# Patient Record
Sex: MALE | Race: WHITE
[De-identification: names, ages, dates, MRNs, and addresses within clinical notes are randomized per-mention and may not be internally consistent; named-entity substitution may affect disease eponyms.]

---

## 2021-11-30 ENCOUNTER — HOSPITAL ENCOUNTER (INPATIENT)
Dept: HOSPITAL 94 - ER | Age: 61
LOS: 9 days | Discharge: HOME | DRG: 853 | End: 2021-12-09
Attending: SURGERY | Admitting: SURGERY
Payer: COMMERCIAL

## 2021-11-30 VITALS — DIASTOLIC BLOOD PRESSURE: 72 MMHG | SYSTOLIC BLOOD PRESSURE: 138 MMHG

## 2021-11-30 VITALS — DIASTOLIC BLOOD PRESSURE: 64 MMHG | SYSTOLIC BLOOD PRESSURE: 102 MMHG

## 2021-11-30 VITALS — SYSTOLIC BLOOD PRESSURE: 106 MMHG | DIASTOLIC BLOOD PRESSURE: 63 MMHG

## 2021-11-30 VITALS — SYSTOLIC BLOOD PRESSURE: 133 MMHG | DIASTOLIC BLOOD PRESSURE: 77 MMHG

## 2021-11-30 VITALS — DIASTOLIC BLOOD PRESSURE: 60 MMHG | SYSTOLIC BLOOD PRESSURE: 98 MMHG

## 2021-11-30 VITALS — WEIGHT: 230.49 LBS | HEIGHT: 72 IN | BODY MASS INDEX: 31.22 KG/M2

## 2021-11-30 VITALS — DIASTOLIC BLOOD PRESSURE: 59 MMHG | SYSTOLIC BLOOD PRESSURE: 100 MMHG

## 2021-11-30 VITALS — SYSTOLIC BLOOD PRESSURE: 143 MMHG | DIASTOLIC BLOOD PRESSURE: 83 MMHG

## 2021-11-30 VITALS — SYSTOLIC BLOOD PRESSURE: 110 MMHG | DIASTOLIC BLOOD PRESSURE: 70 MMHG

## 2021-11-30 VITALS — DIASTOLIC BLOOD PRESSURE: 61 MMHG | SYSTOLIC BLOOD PRESSURE: 102 MMHG

## 2021-11-30 VITALS — DIASTOLIC BLOOD PRESSURE: 58 MMHG | SYSTOLIC BLOOD PRESSURE: 99 MMHG

## 2021-11-30 VITALS — SYSTOLIC BLOOD PRESSURE: 113 MMHG | DIASTOLIC BLOOD PRESSURE: 70 MMHG

## 2021-11-30 VITALS — DIASTOLIC BLOOD PRESSURE: 62 MMHG | SYSTOLIC BLOOD PRESSURE: 98 MMHG

## 2021-11-30 VITALS — DIASTOLIC BLOOD PRESSURE: 64 MMHG | SYSTOLIC BLOOD PRESSURE: 101 MMHG

## 2021-11-30 DIAGNOSIS — J96.00: ICD-10-CM

## 2021-11-30 DIAGNOSIS — E66.9: ICD-10-CM

## 2021-11-30 DIAGNOSIS — K57.20: ICD-10-CM

## 2021-11-30 DIAGNOSIS — K65.9: ICD-10-CM

## 2021-11-30 DIAGNOSIS — Z87.442: ICD-10-CM

## 2021-11-30 DIAGNOSIS — Z20.822: ICD-10-CM

## 2021-11-30 DIAGNOSIS — G70.00: ICD-10-CM

## 2021-11-30 DIAGNOSIS — E87.6: ICD-10-CM

## 2021-11-30 DIAGNOSIS — Z79.899: ICD-10-CM

## 2021-11-30 DIAGNOSIS — A41.9: Primary | ICD-10-CM

## 2021-11-30 DIAGNOSIS — G35: ICD-10-CM

## 2021-11-30 LAB
ALBUMIN SERPL BCP-MCNC: 3 G/DL (ref 3.4–5)
ALBUMIN/GLOB SERPL: 0.7 {RATIO} (ref 1.1–1.5)
ALP SERPL-CCNC: 56 IU/L (ref 46–116)
ALT SERPL W P-5'-P-CCNC: 22 U/L (ref 12–78)
AMORPH URATE CRY #/AREA URNS HPF: (no result) /[HPF]
ANION GAP SERPL CALCULATED.3IONS-SCNC: 10 MMOL/L (ref 8–16)
AST SERPL W P-5'-P-CCNC: 21 U/L (ref 10–37)
BACTERIA URNS QL MICRO: (no result) /HPF
BASE EXCESS BLDA CALC-SCNC: -5.9 MMOL/L (ref -2–2)
BASOPHILS # BLD AUTO: 0 X10'3 (ref 0–0.2)
BASOPHILS NFR BLD AUTO: 0.1 % (ref 0–1)
BILIRUB SERPL-MCNC: 1.3 MG/DL (ref 0.1–1)
BODY TEMPERATURE: 38.6
BUN SERPL-MCNC: 20 MG/DL (ref 7–18)
BUN/CREAT SERPL: 14.6 (ref 5.4–32)
CALCIUM SERPL-MCNC: 8.8 MG/DL (ref 8.5–10.1)
CHLORIDE SERPL-SCNC: 102 MMOL/L (ref 99–107)
CLARITY UR: (no result)
CO2 SERPL-SCNC: 24 MMOL/L (ref 24–32)
COHGB MFR BLDA: 0.2 % (ref 0–3.9)
COLOR UR: YELLOW
CREAT SERPL-MCNC: 1.37 MG/DL (ref 0.6–1.1)
DEPRECATED SQUAMOUS URNS QL MICRO: (no result) /LPF
EOSINOPHIL # BLD AUTO: 0 X10'3 (ref 0–0.9)
EOSINOPHIL NFR BLD AUTO: 0.2 % (ref 0–6)
ERYTHROCYTE [DISTWIDTH] IN BLOOD BY AUTOMATED COUNT: 14.2 % (ref 11.5–14.5)
GFR SERPL CREATININE-BSD FRML MDRD: 53 ML/MIN
GLUCOSE SERPL-MCNC: 160 MG/DL (ref 70–104)
GLUCOSE UR STRIP-MCNC: NEGATIVE MG/DL
HCO3 BLDA-SCNC: 19.6 MMOL/L (ref 22–26)
HCT VFR BLD AUTO: 45.7 % (ref 42–52)
HGB BLD-MCNC: 15.3 G/DL (ref 14–17.9)
HGB BLDA-MCNC: 15.7 G/DL (ref 14–18)
HGB UR QL STRIP: NEGATIVE
HYALINE CASTS URNS QL MICRO: (no result) /LPF
KETONES UR STRIP-MCNC: 15 MG/DL
LEUKOCYTE ESTERASE UR QL STRIP: NEGATIVE
LIPASE SERPL-CCNC: 128 U/L (ref 73–393)
LYMPHOCYTES # BLD AUTO: 0.7 X10'3 (ref 1.1–4.8)
LYMPHOCYTES NFR BLD AUTO: 4.3 % (ref 21–51)
MCH RBC QN AUTO: 32.1 PG (ref 27–31)
MCHC RBC AUTO-ENTMCNC: 33.6 G/DL (ref 33–36.5)
MCV RBC AUTO: 95.6 FL (ref 78–98)
METHGB MFR BLDA: 0.5 % (ref 0–1.5)
MONOCYTES # BLD AUTO: 1.2 X10'3 (ref 0–0.9)
MONOCYTES NFR BLD AUTO: 7.1 % (ref 2–12)
MUCOUS THREADS URNS QL MICRO: (no result) /LPF
NEUTROPHILS # BLD AUTO: 15.1 X10'3 (ref 1.8–7.7)
NEUTROPHILS NFR BLD AUTO: 88.3 % (ref 42–75)
NITRITE UR QL STRIP: NEGATIVE
O2/TOTAL GAS SETTING VFR VENT: 80 MMHG/%
OXYHGB MFR BLDA: 98.3 % (ref 94–97)
PCO2 TEMP ADJ BLDA: 41.7 MMHG (ref 35–48)
PEEP RESPIRATORY: 5 CM H2O
PH UR STRIP: 5.5 [PH] (ref 4.8–8)
PLATELET # BLD AUTO: 203 X10'3 (ref 140–440)
PMV BLD AUTO: 7.6 FL (ref 7.4–10.4)
PO2 TEMP ADJ BLD: 193.4 MMHG (ref 75–100)
POTASSIUM SERPL-SCNC: 4 MMOL/L (ref 3.5–5.1)
PROT SERPL-MCNC: 7.2 G/DL (ref 6.4–8.2)
PROT UR QL STRIP: NEGATIVE MG/DL
RBC # BLD AUTO: 4.78 X10'6 (ref 4.7–6.1)
RBC #/AREA URNS HPF: (no result) /HPF (ref 0–2)
RESP RATE 1H: 16 B/MIN
SAO2 % BLDA: 99 % (ref 94–97)
SODIUM SERPL-SCNC: 136 MMOL/L (ref 135–145)
SP GR UR STRIP: >=1.03 (ref 1–1.03)
SPONT VT COMPRES GAS VOL SET UNCOR VENT: 450 ML
URN COLLECT METHOD CLASS: (no result)
UROBILINOGEN UR STRIP-MCNC: 0.2 E.U/DL (ref 0.2–1)
WBC # BLD AUTO: 17.1 X10'3 (ref 4.5–11)
WBC #/AREA URNS HPF: (no result) /HPF (ref 0–4)

## 2021-11-30 PROCEDURE — 83735 ASSAY OF MAGNESIUM: CPT

## 2021-11-30 PROCEDURE — 97116 GAIT TRAINING THERAPY: CPT

## 2021-11-30 PROCEDURE — 0DTN0ZZ RESECTION OF SIGMOID COLON, OPEN APPROACH: ICD-10-PCS | Performed by: SURGERY

## 2021-11-30 PROCEDURE — 74018 RADEX ABDOMEN 1 VIEW: CPT

## 2021-11-30 PROCEDURE — 94760 N-INVAS EAR/PLS OXIMETRY 1: CPT

## 2021-11-30 PROCEDURE — 74177 CT ABD & PELVIS W/CONTRAST: CPT

## 2021-11-30 PROCEDURE — 87040 BLOOD CULTURE FOR BACTERIA: CPT

## 2021-11-30 PROCEDURE — 93005 ELECTROCARDIOGRAM TRACING: CPT

## 2021-11-30 PROCEDURE — 85025 COMPLETE CBC W/AUTO DIFF WBC: CPT

## 2021-11-30 PROCEDURE — 87081 CULTURE SCREEN ONLY: CPT

## 2021-11-30 PROCEDURE — 84100 ASSAY OF PHOSPHORUS: CPT

## 2021-11-30 PROCEDURE — 97110 THERAPEUTIC EXERCISES: CPT

## 2021-11-30 PROCEDURE — 36415 COLL VENOUS BLD VENIPUNCTURE: CPT

## 2021-11-30 PROCEDURE — 82810 BLOOD GASES O2 SAT ONLY: CPT

## 2021-11-30 PROCEDURE — 83690 ASSAY OF LIPASE: CPT

## 2021-11-30 PROCEDURE — 99292 CRITICAL CARE ADDL 30 MIN: CPT

## 2021-11-30 PROCEDURE — 86900 BLOOD TYPING SEROLOGIC ABO: CPT

## 2021-11-30 PROCEDURE — 0D1M0Z4 BYPASS DESCENDING COLON TO CUTANEOUS, OPEN APPROACH: ICD-10-PCS | Performed by: SURGERY

## 2021-11-30 PROCEDURE — 86901 BLOOD TYPING SEROLOGIC RH(D): CPT

## 2021-11-30 PROCEDURE — 36600 WITHDRAWAL OF ARTERIAL BLOOD: CPT

## 2021-11-30 PROCEDURE — 94003 VENT MGMT INPAT SUBQ DAY: CPT

## 2021-11-30 PROCEDURE — 85610 PROTHROMBIN TIME: CPT

## 2021-11-30 PROCEDURE — 84145 PROCALCITONIN (PCT): CPT

## 2021-11-30 PROCEDURE — 82948 REAGENT STRIP/BLOOD GLUCOSE: CPT

## 2021-11-30 PROCEDURE — 97161 PT EVAL LOW COMPLEX 20 MIN: CPT

## 2021-11-30 PROCEDURE — 94002 VENT MGMT INPAT INIT DAY: CPT

## 2021-11-30 PROCEDURE — 81001 URINALYSIS AUTO W/SCOPE: CPT

## 2021-11-30 PROCEDURE — 84550 ASSAY OF BLOOD/URIC ACID: CPT

## 2021-11-30 PROCEDURE — 92508 TX SP LANG VOICE COMM GROUP: CPT

## 2021-11-30 PROCEDURE — 80048 BASIC METABOLIC PNL TOTAL CA: CPT

## 2021-11-30 PROCEDURE — 83605 ASSAY OF LACTIC ACID: CPT

## 2021-11-30 PROCEDURE — 71045 X-RAY EXAM CHEST 1 VIEW: CPT

## 2021-11-30 PROCEDURE — 99291 CRITICAL CARE FIRST HOUR: CPT

## 2021-11-30 PROCEDURE — 92616 FEES W/LARYNGEAL SENSE TEST: CPT

## 2021-11-30 PROCEDURE — 86885 COOMBS TEST INDIRECT QUAL: CPT

## 2021-11-30 PROCEDURE — 85018 HEMOGLOBIN: CPT

## 2021-11-30 PROCEDURE — 87070 CULTURE OTHR SPECIMN AEROBIC: CPT

## 2021-11-30 PROCEDURE — 82803 BLOOD GASES ANY COMBINATION: CPT

## 2021-11-30 PROCEDURE — 84443 ASSAY THYROID STIM HORMONE: CPT

## 2021-11-30 PROCEDURE — 97530 THERAPEUTIC ACTIVITIES: CPT

## 2021-11-30 PROCEDURE — BW211ZZ COMPUTERIZED TOMOGRAPHY (CT SCAN) OF ABDOMEN AND PELVIS USING LOW OSMOLAR CONTRAST: ICD-10-PCS

## 2021-11-30 PROCEDURE — 80053 COMPREHEN METABOLIC PANEL: CPT

## 2021-11-30 PROCEDURE — 87635 SARS-COV-2 COVID-19 AMP PRB: CPT

## 2021-11-30 RX ADMIN — TAZOBACTAM SODIUM AND PIPERACILLIN SODIUM SCH MLS/HR: 375; 3 INJECTION, SOLUTION INTRAVENOUS at 19:15

## 2021-11-30 RX ADMIN — NOREPINEPHRINE BITARTRATE SCH MLS/HR: 1 INJECTION INTRAVENOUS at 11:40

## 2021-11-30 RX ADMIN — SODIUM CHLORIDE SCH MLS/HR: 9 INJECTION INTRAMUSCULAR; INTRAVENOUS; SUBCUTANEOUS at 22:45

## 2021-11-30 RX ADMIN — MORPHINE SULFATE PRN MG: 4 INJECTION, SOLUTION INTRAMUSCULAR; INTRAVENOUS at 13:10

## 2021-11-30 RX ADMIN — SODIUM CHLORIDE SCH MLS/HR: 9 INJECTION INTRAMUSCULAR; INTRAVENOUS; SUBCUTANEOUS at 12:55

## 2021-11-30 RX ADMIN — Medication PRN MLS/HR: at 17:09

## 2021-11-30 RX ADMIN — Medication PRN MLS/HR: at 22:45

## 2021-11-30 RX ADMIN — Medication PRN MLS/HR: at 17:08

## 2021-11-30 RX ADMIN — SODIUM CHLORIDE SCH MLS/HR: 9 INJECTION INTRAMUSCULAR; INTRAVENOUS; SUBCUTANEOUS at 18:20

## 2021-11-30 NOTE — NUR
Patient in room ICU 2042. I have received report from Recovery RN Cee and had the 
opportunity to ask questions and assume patient care. Report given to Kian NGUYEN.

## 2021-11-30 NOTE — NUR
Attempted to give report to the Recovery Room.  Nurse stated that he is going 
straight to the OR and I don't need to give report.  She said to call the OR 
and have them transport the patient.

## 2021-11-30 NOTE — NUR
Patient in room ICU 2042. I have received report from Daphne NGUYEN and had the opportunity to 
ask questions and assume patient care.

## 2021-11-30 NOTE — NUR
Pt c/o severe generalized abd pain.  Hypoactive BS in all quadrants.  Wife at 
the bedside.  Pt is tachycardic.  MD notified.

## 2021-11-30 NOTE — NUR
Attempted to place an NGT as well as OGT, could not get it to pass. It repeatedly coiled in 
the back of the PT's mouth.

## 2021-11-30 NOTE — NUR
PT RESTING COMFORTABLY, SIMV-FIO2 DROPPED TO 50%, VSS, FENTANYL AND VERSED INCREASED PRN BY 
PRIMARY RN TO ASSIST WITH AGITATION, NO CHANGES IN DRSG-OSTOMY INTACT, PT HAS HX OF 
MYASTHENIA GRAVIS, CARE TURNED OVER TO PRIMARY RN-ANUPAMA WHO WAS PRESENT WHEN PT ARRIVED. ALL 
QUESTIONS ANSWERED.

## 2021-11-30 NOTE — NUR
PT ARRIVED TO ICU BED 2042, ACCOMPANIED BY DR. MARTIN, ANESTHESIA-REPORT GIVEN, PT WAKING UP, 
ABLE TO FOLLOW COMMANDS, GOOD HAND , MOVING EXTREMITIES, F/C IN PLACE-DRAINING YELLOW 
URINE, RIGHT GROIN 4 LUMEN CVL-PLACED IN ER, HAS LEVOPHED AND NS RUNNING UPON ARRIVAL, LEVO 
STOPPED PER DR. MARTIN. VSS, SIMV SETTING-SEE FLOWSHEET, ART INE TO RIGHT WRIST, ABD DRSG 
MIDLINE-INTACT NOTED TO HAVE SHADE OF BLEEDING-CIRCLED, FRESH OSTOMY PLACED TO LLQ-NO OUTPUT 
NOTED, BAG IN PLACE, SCDS ON, ET TUBE-8.0 22CM AT LIP-CXR DONE TO CHECK PLACEMENT. RECEIVING 
RN PRESENT AS WELL.

## 2021-12-01 VITALS — SYSTOLIC BLOOD PRESSURE: 110 MMHG | DIASTOLIC BLOOD PRESSURE: 76 MMHG

## 2021-12-01 VITALS — DIASTOLIC BLOOD PRESSURE: 85 MMHG | SYSTOLIC BLOOD PRESSURE: 121 MMHG

## 2021-12-01 VITALS — DIASTOLIC BLOOD PRESSURE: 56 MMHG | SYSTOLIC BLOOD PRESSURE: 99 MMHG

## 2021-12-01 VITALS — SYSTOLIC BLOOD PRESSURE: 110 MMHG | DIASTOLIC BLOOD PRESSURE: 75 MMHG

## 2021-12-01 VITALS — DIASTOLIC BLOOD PRESSURE: 59 MMHG | SYSTOLIC BLOOD PRESSURE: 105 MMHG

## 2021-12-01 VITALS — SYSTOLIC BLOOD PRESSURE: 142 MMHG | DIASTOLIC BLOOD PRESSURE: 75 MMHG

## 2021-12-01 VITALS — SYSTOLIC BLOOD PRESSURE: 116 MMHG | DIASTOLIC BLOOD PRESSURE: 76 MMHG

## 2021-12-01 VITALS — SYSTOLIC BLOOD PRESSURE: 103 MMHG | DIASTOLIC BLOOD PRESSURE: 57 MMHG

## 2021-12-01 VITALS — DIASTOLIC BLOOD PRESSURE: 54 MMHG | SYSTOLIC BLOOD PRESSURE: 93 MMHG

## 2021-12-01 VITALS — SYSTOLIC BLOOD PRESSURE: 112 MMHG | DIASTOLIC BLOOD PRESSURE: 68 MMHG

## 2021-12-01 VITALS — DIASTOLIC BLOOD PRESSURE: 54 MMHG | SYSTOLIC BLOOD PRESSURE: 95 MMHG

## 2021-12-01 VITALS — DIASTOLIC BLOOD PRESSURE: 60 MMHG | SYSTOLIC BLOOD PRESSURE: 105 MMHG

## 2021-12-01 VITALS — DIASTOLIC BLOOD PRESSURE: 77 MMHG | SYSTOLIC BLOOD PRESSURE: 120 MMHG

## 2021-12-01 VITALS — SYSTOLIC BLOOD PRESSURE: 116 MMHG | DIASTOLIC BLOOD PRESSURE: 75 MMHG

## 2021-12-01 VITALS — DIASTOLIC BLOOD PRESSURE: 73 MMHG | SYSTOLIC BLOOD PRESSURE: 116 MMHG

## 2021-12-01 VITALS — SYSTOLIC BLOOD PRESSURE: 132 MMHG | DIASTOLIC BLOOD PRESSURE: 67 MMHG

## 2021-12-01 VITALS — SYSTOLIC BLOOD PRESSURE: 121 MMHG | DIASTOLIC BLOOD PRESSURE: 77 MMHG

## 2021-12-01 VITALS — SYSTOLIC BLOOD PRESSURE: 117 MMHG | DIASTOLIC BLOOD PRESSURE: 62 MMHG

## 2021-12-01 VITALS — SYSTOLIC BLOOD PRESSURE: 106 MMHG | DIASTOLIC BLOOD PRESSURE: 57 MMHG

## 2021-12-01 VITALS — DIASTOLIC BLOOD PRESSURE: 71 MMHG | SYSTOLIC BLOOD PRESSURE: 108 MMHG

## 2021-12-01 VITALS — SYSTOLIC BLOOD PRESSURE: 92 MMHG | DIASTOLIC BLOOD PRESSURE: 52 MMHG

## 2021-12-01 VITALS — DIASTOLIC BLOOD PRESSURE: 68 MMHG | SYSTOLIC BLOOD PRESSURE: 130 MMHG

## 2021-12-01 VITALS — DIASTOLIC BLOOD PRESSURE: 67 MMHG | SYSTOLIC BLOOD PRESSURE: 128 MMHG

## 2021-12-01 VITALS — SYSTOLIC BLOOD PRESSURE: 113 MMHG | DIASTOLIC BLOOD PRESSURE: 71 MMHG

## 2021-12-01 LAB
ALBUMIN SERPL BCP-MCNC: 2.1 G/DL (ref 3.4–5)
ANION GAP SERPL CALCULATED.3IONS-SCNC: 10 MMOL/L (ref 8–16)
BASE EXCESS BLDA CALC-SCNC: -3.8 MMOL/L (ref -2–2)
BASOPHILS # BLD AUTO: 0 X10'3 (ref 0–0.2)
BASOPHILS NFR BLD AUTO: 0 % (ref 0–1)
BODY TEMPERATURE: 36.4
BUN SERPL-MCNC: 16 MG/DL (ref 7–18)
BUN/CREAT SERPL: 14.5 (ref 5.4–32)
CALCIUM SERPL-MCNC: 8.3 MG/DL (ref 8.5–10.1)
CHLORIDE SERPL-SCNC: 107 MMOL/L (ref 99–107)
CO2 SERPL-SCNC: 22.9 MMOL/L (ref 24–32)
COHGB MFR BLDA: 0.6 % (ref 0–3.9)
CREAT SERPL-MCNC: 1.1 MG/DL (ref 0.6–1.1)
EOSINOPHIL # BLD AUTO: 0 X10'3 (ref 0–0.9)
EOSINOPHIL NFR BLD AUTO: 0 % (ref 0–6)
ERYTHROCYTE [DISTWIDTH] IN BLOOD BY AUTOMATED COUNT: 13.9 % (ref 11.5–14.5)
GFR SERPL CREATININE-BSD FRML MDRD: 68 ML/MIN
GLUCOSE SERPL-MCNC: 143 MG/DL (ref 70–104)
HCO3 BLDA-SCNC: 21.1 MMOL/L (ref 22–26)
HCT VFR BLD AUTO: 41.7 % (ref 42–52)
HGB BLD-MCNC: 14 G/DL (ref 14–17.9)
HGB BLDA-MCNC: 14.2 G/DL (ref 14–18)
LYMPHOCYTES # BLD AUTO: 0.5 X10'3 (ref 1.1–4.8)
LYMPHOCYTES NFR BLD AUTO: 3.1 % (ref 21–51)
MAGNESIUM SERPL-MCNC: 1.9 MG/DL (ref 1.5–2.4)
MCH RBC QN AUTO: 32.1 PG (ref 27–31)
MCHC RBC AUTO-ENTMCNC: 33.7 G/DL (ref 33–36.5)
MCV RBC AUTO: 95.5 FL (ref 78–98)
METHGB MFR BLDA: 0.3 % (ref 0–1.5)
MONOCYTES # BLD AUTO: 0.7 X10'3 (ref 0–0.9)
MONOCYTES NFR BLD AUTO: 4.4 % (ref 2–12)
NEUTROPHILS # BLD AUTO: 14.2 X10'3 (ref 1.8–7.7)
NEUTROPHILS NFR BLD AUTO: 92.5 % (ref 42–75)
O2/TOTAL GAS SETTING VFR VENT: 40 MMHG/%
OXYHGB MFR BLDA: 97.3 % (ref 94–97)
PCO2 TEMP ADJ BLDA: 36.7 MMHG (ref 35–48)
PHOSPHATE SERPL-MCNC: 3.2 MG/DL (ref 2.3–4.5)
PLATELET # BLD AUTO: 170 X10'3 (ref 140–440)
PMV BLD AUTO: 7.6 FL (ref 7.4–10.4)
PO2 TEMP ADJ BLD: 108.3 MMHG (ref 75–100)
PO2 TEMP ADJ BLDMV: 34.9 MMHG (ref 35–46)
POTASSIUM SERPL-SCNC: 4.3 MMOL/L (ref 3.5–5.1)
RBC # BLD AUTO: 4.36 X10'6 (ref 4.7–6.1)
RESP RATE 1H: 16 B/MIN
SAO2 % BLDA: 98.2 % (ref 94–97)
SAO2 % BLDMV: 71 % (ref 60–80)
SODIUM SERPL-SCNC: 140 MMOL/L (ref 135–145)
SPONT VT COMPRES GAS VOL SET UNCOR VENT: 450 ML
WBC # BLD AUTO: 15.3 X10'3 (ref 4.5–11)

## 2021-12-01 RX ADMIN — Medication PRN MLS/HR: at 12:08

## 2021-12-01 RX ADMIN — SODIUM CHLORIDE SCH MLS/HR: 9 INJECTION INTRAMUSCULAR; INTRAVENOUS; SUBCUTANEOUS at 14:49

## 2021-12-01 RX ADMIN — SODIUM CHLORIDE SCH MLS/HR: 9 INJECTION INTRAMUSCULAR; INTRAVENOUS; SUBCUTANEOUS at 21:12

## 2021-12-01 RX ADMIN — SODIUM CHLORIDE SCH MG: 9 INJECTION INTRAMUSCULAR; INTRAVENOUS; SUBCUTANEOUS at 07:57

## 2021-12-01 RX ADMIN — SODIUM CHLORIDE SCH MG: 9 INJECTION INTRAMUSCULAR; INTRAVENOUS; SUBCUTANEOUS at 21:12

## 2021-12-01 RX ADMIN — SODIUM CHLORIDE SCH MLS/HR: 9 INJECTION INTRAMUSCULAR; INTRAVENOUS; SUBCUTANEOUS at 05:27

## 2021-12-01 RX ADMIN — Medication PRN MLS/HR: at 14:40

## 2021-12-01 RX ADMIN — TAZOBACTAM SODIUM AND PIPERACILLIN SODIUM SCH MLS/HR: 375; 3 INJECTION, SOLUTION INTRAVENOUS at 17:26

## 2021-12-01 RX ADMIN — HEPARIN SODIUM SCH UNIT: 5000 INJECTION, SOLUTION INTRAVENOUS; SUBCUTANEOUS at 17:26

## 2021-12-01 RX ADMIN — PYRIDOSTIGMINE BROMIDE SCH MG: 60 TABLET ORAL at 12:08

## 2021-12-01 RX ADMIN — TAZOBACTAM SODIUM AND PIPERACILLIN SODIUM SCH MLS/HR: 375; 3 INJECTION, SOLUTION INTRAVENOUS at 07:57

## 2021-12-01 RX ADMIN — HEPARIN SODIUM SCH UNIT: 5000 INJECTION, SOLUTION INTRAVENOUS; SUBCUTANEOUS at 09:39

## 2021-12-01 RX ADMIN — TAZOBACTAM SODIUM AND PIPERACILLIN SODIUM SCH MLS/HR: 375; 3 INJECTION, SOLUTION INTRAVENOUS at 01:28

## 2021-12-01 RX ADMIN — PYRIDOSTIGMINE BROMIDE SCH MG: 60 TABLET ORAL at 21:00

## 2021-12-01 RX ADMIN — Medication PRN MLS/HR: at 21:59

## 2021-12-01 NOTE — NUR
Initial: Pt intubated admit DX sepsis w/ perforated colon s/p ex lap, 

sigmoid resection, and colostomy per EMR. Remains NPO at this time. MAP 93 

during RD visit this AM and NG placement confirmed today per RN. Bed scaled

wt 108.8kg during RD visit this AM first scaled wt this admit; true BMI 33 

at this time. TF recs below in case prolonged intubation and GI surgeon 

agreeable. Will monitor for nutrition support needs this admit.

Rec:

1. IF TF; Vital High Protein at 85ml/hr goal. Would provide 2040ml 

volume/day, 2040 kcals, 1714ml water, and 179g protein.

2. IF TF; additional water flush 100ml Q4H; monitor serum Na 140 this AM 

receiving NS at 150ml/hr w/ prior Na 136 11/30 per EMR

3. IF TF; PALB Q M/Th; daily wts

4. bowel care per MD post-op

5. upon extubation; advance diet as medically indicated to low-residue. 

Consider SLP BSS given myasthenia gravis hx as medically indicated

6. Colostomy education once appropriate following extubation

-------------------------------------------------------------------------------

Addendum: 12/01/21 at 1237 by Keaton Camacho RD

-------------------------------------------------------------------------------

Amended: Links added.

## 2021-12-01 NOTE — NUR
Colostomy appears flushed with surround skin, stoma brick red, no output at this time. 
Inappropriate to teach at this time, will continue to follow. 

-------------------------------------------------------------------------------

Addendum: 12/01/21 at 1134 by Maria Luisa Rangel RN

-------------------------------------------------------------------------------

Amended: Links added.

## 2021-12-01 NOTE — NUR
Patient in room ICU 2042. I have received report from Yoselyn NGUYEN and had the opportunity to 
ask questions and assume patient care.

## 2021-12-01 NOTE — NUR
Patient in room ICU 2042. I have received report from Bebe NGUYEN and had the opportunity to 
ask questions and assume patient care.

## 2021-12-01 NOTE — NUR
Problems reprioritized. Patient report given, questions answered & plan of care reviewed 
with Ashley NGUYEN.

## 2021-12-02 VITALS — DIASTOLIC BLOOD PRESSURE: 71 MMHG | SYSTOLIC BLOOD PRESSURE: 116 MMHG

## 2021-12-02 VITALS — DIASTOLIC BLOOD PRESSURE: 83 MMHG | SYSTOLIC BLOOD PRESSURE: 137 MMHG

## 2021-12-02 VITALS — SYSTOLIC BLOOD PRESSURE: 148 MMHG | DIASTOLIC BLOOD PRESSURE: 82 MMHG

## 2021-12-02 VITALS — SYSTOLIC BLOOD PRESSURE: 119 MMHG | DIASTOLIC BLOOD PRESSURE: 73 MMHG

## 2021-12-02 VITALS — SYSTOLIC BLOOD PRESSURE: 130 MMHG | DIASTOLIC BLOOD PRESSURE: 72 MMHG

## 2021-12-02 VITALS — SYSTOLIC BLOOD PRESSURE: 134 MMHG | DIASTOLIC BLOOD PRESSURE: 76 MMHG

## 2021-12-02 VITALS — SYSTOLIC BLOOD PRESSURE: 135 MMHG | DIASTOLIC BLOOD PRESSURE: 89 MMHG

## 2021-12-02 VITALS — DIASTOLIC BLOOD PRESSURE: 84 MMHG | SYSTOLIC BLOOD PRESSURE: 131 MMHG

## 2021-12-02 VITALS — DIASTOLIC BLOOD PRESSURE: 71 MMHG | SYSTOLIC BLOOD PRESSURE: 114 MMHG

## 2021-12-02 VITALS — SYSTOLIC BLOOD PRESSURE: 137 MMHG | DIASTOLIC BLOOD PRESSURE: 83 MMHG

## 2021-12-02 VITALS — SYSTOLIC BLOOD PRESSURE: 132 MMHG | DIASTOLIC BLOOD PRESSURE: 80 MMHG

## 2021-12-02 VITALS — SYSTOLIC BLOOD PRESSURE: 118 MMHG | DIASTOLIC BLOOD PRESSURE: 71 MMHG

## 2021-12-02 VITALS — DIASTOLIC BLOOD PRESSURE: 78 MMHG | SYSTOLIC BLOOD PRESSURE: 134 MMHG

## 2021-12-02 VITALS — DIASTOLIC BLOOD PRESSURE: 79 MMHG | SYSTOLIC BLOOD PRESSURE: 124 MMHG

## 2021-12-02 VITALS — SYSTOLIC BLOOD PRESSURE: 124 MMHG | DIASTOLIC BLOOD PRESSURE: 82 MMHG

## 2021-12-02 VITALS — DIASTOLIC BLOOD PRESSURE: 76 MMHG | SYSTOLIC BLOOD PRESSURE: 122 MMHG

## 2021-12-02 VITALS — SYSTOLIC BLOOD PRESSURE: 122 MMHG | DIASTOLIC BLOOD PRESSURE: 68 MMHG

## 2021-12-02 VITALS — SYSTOLIC BLOOD PRESSURE: 153 MMHG | DIASTOLIC BLOOD PRESSURE: 75 MMHG

## 2021-12-02 VITALS — SYSTOLIC BLOOD PRESSURE: 154 MMHG | DIASTOLIC BLOOD PRESSURE: 104 MMHG

## 2021-12-02 VITALS — SYSTOLIC BLOOD PRESSURE: 142 MMHG | DIASTOLIC BLOOD PRESSURE: 81 MMHG

## 2021-12-02 VITALS — SYSTOLIC BLOOD PRESSURE: 141 MMHG | DIASTOLIC BLOOD PRESSURE: 74 MMHG

## 2021-12-02 VITALS — SYSTOLIC BLOOD PRESSURE: 136 MMHG | DIASTOLIC BLOOD PRESSURE: 83 MMHG

## 2021-12-02 VITALS — DIASTOLIC BLOOD PRESSURE: 72 MMHG | SYSTOLIC BLOOD PRESSURE: 115 MMHG

## 2021-12-02 LAB
ALBUMIN SERPL BCP-MCNC: 1.9 G/DL (ref 3.4–5)
ANION GAP SERPL CALCULATED.3IONS-SCNC: 9 MMOL/L (ref 8–16)
BASE EXCESS BLDA CALC-SCNC: -2.2 MMOL/L (ref -2–2)
BASOPHILS # BLD AUTO: 0 X10'3 (ref 0–0.2)
BASOPHILS NFR BLD AUTO: 0.1 % (ref 0–1)
BODY TEMPERATURE: 37.1
BUN SERPL-MCNC: 20 MG/DL (ref 7–18)
BUN/CREAT SERPL: 20.6 (ref 5.4–32)
CALCIUM SERPL-MCNC: 8.4 MG/DL (ref 8.5–10.1)
CHLORIDE SERPL-SCNC: 108 MMOL/L (ref 99–107)
CO2 SERPL-SCNC: 24.3 MMOL/L (ref 24–32)
COHGB MFR BLDA: 0.1 % (ref 0–3.9)
CREAT SERPL-MCNC: 0.97 MG/DL (ref 0.6–1.1)
EOSINOPHIL # BLD AUTO: 0 X10'3 (ref 0–0.9)
EOSINOPHIL NFR BLD AUTO: 0.3 % (ref 0–6)
ERYTHROCYTE [DISTWIDTH] IN BLOOD BY AUTOMATED COUNT: 14 % (ref 11.5–14.5)
GFR SERPL CREATININE-BSD FRML MDRD: 79 ML/MIN
GLUCOSE SERPL-MCNC: 114 MG/DL (ref 70–104)
HCO3 BLDA-SCNC: 21.6 MMOL/L (ref 22–26)
HCT VFR BLD AUTO: 38.3 % (ref 42–52)
HGB BLD-MCNC: 12.8 G/DL (ref 14–17.9)
HGB BLDA-MCNC: 13.6 G/DL (ref 14–18)
LYMPHOCYTES # BLD AUTO: 0.9 X10'3 (ref 1.1–4.8)
LYMPHOCYTES NFR BLD AUTO: 7.5 % (ref 21–51)
MAGNESIUM SERPL-MCNC: 2.1 MG/DL (ref 1.5–2.4)
MCH RBC QN AUTO: 31.9 PG (ref 27–31)
MCHC RBC AUTO-ENTMCNC: 33.4 G/DL (ref 33–36.5)
MCV RBC AUTO: 95.6 FL (ref 78–98)
METHGB MFR BLDA: 0.3 % (ref 0–1.5)
MONOCYTES # BLD AUTO: 0.7 X10'3 (ref 0–0.9)
MONOCYTES NFR BLD AUTO: 5.6 % (ref 2–12)
NEUTROPHILS # BLD AUTO: 10.2 X10'3 (ref 1.8–7.7)
NEUTROPHILS NFR BLD AUTO: 86.5 % (ref 42–75)
O2/TOTAL GAS SETTING VFR VENT: 30 MMHG/%
OXYHGB MFR BLDA: 97.6 % (ref 94–97)
PCO2 TEMP ADJ BLDA: 34.2 MMHG (ref 35–48)
PEEP RESPIRATORY: 5 CM H2O
PHOSPHATE SERPL-MCNC: 2.2 MG/DL (ref 2.3–4.5)
PLATELET # BLD AUTO: 197 X10'3 (ref 140–440)
PMV BLD AUTO: 7.9 FL (ref 7.4–10.4)
PO2 TEMP ADJ BLD: 103.7 MMHG (ref 75–100)
POTASSIUM SERPL-SCNC: 4.1 MMOL/L (ref 3.5–5.1)
RBC # BLD AUTO: 4.01 X10'6 (ref 4.7–6.1)
RESP RATE 1H: 16 B/MIN
SAO2 % BLDA: 98 % (ref 94–97)
SODIUM SERPL-SCNC: 141 MMOL/L (ref 135–145)
SPONT VT COMPRES GAS VOL SET UNCOR VENT: 450 ML
WBC # BLD AUTO: 11.8 X10'3 (ref 4.5–11)

## 2021-12-02 RX ADMIN — SODIUM CHLORIDE SCH MG: 9 INJECTION INTRAMUSCULAR; INTRAVENOUS; SUBCUTANEOUS at 20:51

## 2021-12-02 RX ADMIN — SODIUM CHLORIDE SCH MG: 9 INJECTION INTRAMUSCULAR; INTRAVENOUS; SUBCUTANEOUS at 09:22

## 2021-12-02 RX ADMIN — SODIUM CHLORIDE SCH MLS/HR: 9 INJECTION INTRAMUSCULAR; INTRAVENOUS; SUBCUTANEOUS at 09:56

## 2021-12-02 RX ADMIN — TAZOBACTAM SODIUM AND PIPERACILLIN SODIUM SCH MLS/HR: 375; 3 INJECTION, SOLUTION INTRAVENOUS at 09:22

## 2021-12-02 RX ADMIN — HEPARIN SODIUM SCH UNIT: 5000 INJECTION, SOLUTION INTRAVENOUS; SUBCUTANEOUS at 09:22

## 2021-12-02 RX ADMIN — PYRIDOSTIGMINE BROMIDE SCH MG: 60 TABLET ORAL at 11:12

## 2021-12-02 RX ADMIN — MORPHINE SULFATE PRN MG: 4 INJECTION, SOLUTION INTRAMUSCULAR; INTRAVENOUS at 22:29

## 2021-12-02 RX ADMIN — PYRIDOSTIGMINE BROMIDE SCH MG: 60 TABLET ORAL at 20:51

## 2021-12-02 RX ADMIN — HEPARIN SODIUM SCH UNIT: 5000 INJECTION, SOLUTION INTRAVENOUS; SUBCUTANEOUS at 15:57

## 2021-12-02 RX ADMIN — SODIUM CHLORIDE SCH MLS/HR: 9 INJECTION INTRAMUSCULAR; INTRAVENOUS; SUBCUTANEOUS at 03:40

## 2021-12-02 RX ADMIN — TAZOBACTAM SODIUM AND PIPERACILLIN SODIUM SCH MLS/HR: 375; 3 INJECTION, SOLUTION INTRAVENOUS at 00:14

## 2021-12-02 RX ADMIN — NOREPINEPHRINE BITARTRATE SCH MLS/HR: 1 INJECTION INTRAVENOUS at 11:40

## 2021-12-02 RX ADMIN — PYRIDOSTIGMINE BROMIDE SCH MG: 60 TABLET ORAL at 15:56

## 2021-12-02 RX ADMIN — HEPARIN SODIUM SCH UNIT: 5000 INJECTION, SOLUTION INTRAVENOUS; SUBCUTANEOUS at 00:14

## 2021-12-02 RX ADMIN — TAZOBACTAM SODIUM AND PIPERACILLIN SODIUM SCH MLS/HR: 375; 3 INJECTION, SOLUTION INTRAVENOUS at 15:57

## 2021-12-02 NOTE — NUR
F/u 12/2: Pt extubated currently NPO at this time w/ NG unable to be 

successfully placed prior to extubation per charge RN at rounds. RD 

recommends SLP BSS given hx myasthenia gravis; MD notified. Will monitor 

for PO diet advancement and further nutrition intervention needs this 

admit.

Rec:

1. advance diet as medically indicated to low-residue per SLP/MD recs 

given myasthenia gravis hx

2. monitor for ONS needs pending PO trends once diet advances; consider Jim shake BID 
given wound healing needs post-op

3. bowel care per MD post-op

4. weekly wts

5. Colostomy education once appropriate prior to discharge

-------------------------------------------------------------------------------

Addendum: 12/02/21 at 1232 by Keaton Camacho RD

-------------------------------------------------------------------------------

Amended: Links added.

## 2021-12-02 NOTE — NUR
Patient in room ICU 2042. I have received report from Daisy NGUYEN and had the opportunity to 
ask questions and assume patient care.

## 2021-12-02 NOTE — NUR
Problems reprioritized. Patient report given, questions answered & plan of care reviewed 
with Daisy NGUYEN.

## 2021-12-03 VITALS — SYSTOLIC BLOOD PRESSURE: 147 MMHG | DIASTOLIC BLOOD PRESSURE: 90 MMHG

## 2021-12-03 VITALS — DIASTOLIC BLOOD PRESSURE: 72 MMHG | SYSTOLIC BLOOD PRESSURE: 127 MMHG

## 2021-12-03 VITALS — DIASTOLIC BLOOD PRESSURE: 71 MMHG | SYSTOLIC BLOOD PRESSURE: 125 MMHG

## 2021-12-03 VITALS — DIASTOLIC BLOOD PRESSURE: 87 MMHG | SYSTOLIC BLOOD PRESSURE: 156 MMHG

## 2021-12-03 VITALS — SYSTOLIC BLOOD PRESSURE: 116 MMHG | DIASTOLIC BLOOD PRESSURE: 66 MMHG

## 2021-12-03 VITALS — SYSTOLIC BLOOD PRESSURE: 127 MMHG | DIASTOLIC BLOOD PRESSURE: 67 MMHG

## 2021-12-03 VITALS — SYSTOLIC BLOOD PRESSURE: 133 MMHG | DIASTOLIC BLOOD PRESSURE: 80 MMHG

## 2021-12-03 VITALS — DIASTOLIC BLOOD PRESSURE: 85 MMHG | SYSTOLIC BLOOD PRESSURE: 139 MMHG

## 2021-12-03 VITALS — DIASTOLIC BLOOD PRESSURE: 80 MMHG | SYSTOLIC BLOOD PRESSURE: 151 MMHG

## 2021-12-03 VITALS — SYSTOLIC BLOOD PRESSURE: 137 MMHG | DIASTOLIC BLOOD PRESSURE: 79 MMHG

## 2021-12-03 VITALS — SYSTOLIC BLOOD PRESSURE: 139 MMHG | DIASTOLIC BLOOD PRESSURE: 85 MMHG

## 2021-12-03 VITALS — DIASTOLIC BLOOD PRESSURE: 75 MMHG | SYSTOLIC BLOOD PRESSURE: 133 MMHG

## 2021-12-03 VITALS — DIASTOLIC BLOOD PRESSURE: 71 MMHG | SYSTOLIC BLOOD PRESSURE: 132 MMHG

## 2021-12-03 VITALS — DIASTOLIC BLOOD PRESSURE: 65 MMHG | SYSTOLIC BLOOD PRESSURE: 111 MMHG

## 2021-12-03 VITALS — SYSTOLIC BLOOD PRESSURE: 137 MMHG | DIASTOLIC BLOOD PRESSURE: 77 MMHG

## 2021-12-03 VITALS — DIASTOLIC BLOOD PRESSURE: 89 MMHG | SYSTOLIC BLOOD PRESSURE: 138 MMHG

## 2021-12-03 VITALS — DIASTOLIC BLOOD PRESSURE: 71 MMHG | SYSTOLIC BLOOD PRESSURE: 130 MMHG

## 2021-12-03 VITALS — SYSTOLIC BLOOD PRESSURE: 119 MMHG | DIASTOLIC BLOOD PRESSURE: 75 MMHG

## 2021-12-03 VITALS — SYSTOLIC BLOOD PRESSURE: 113 MMHG | DIASTOLIC BLOOD PRESSURE: 68 MMHG

## 2021-12-03 VITALS — SYSTOLIC BLOOD PRESSURE: 142 MMHG | DIASTOLIC BLOOD PRESSURE: 77 MMHG

## 2021-12-03 LAB
ALBUMIN SERPL BCP-MCNC: 2 G/DL (ref 3.4–5)
ANION GAP SERPL CALCULATED.3IONS-SCNC: 6 MMOL/L (ref 8–16)
BASOPHILS # BLD AUTO: 0 X10'3 (ref 0–0.2)
BASOPHILS NFR BLD AUTO: 0.2 % (ref 0–1)
BUN SERPL-MCNC: 18 MG/DL (ref 7–18)
BUN/CREAT SERPL: 17.5 (ref 5.4–32)
CALCIUM SERPL-MCNC: 8.3 MG/DL (ref 8.5–10.1)
CHLORIDE SERPL-SCNC: 108 MMOL/L (ref 99–107)
CO2 SERPL-SCNC: 26 MMOL/L (ref 24–32)
CREAT SERPL-MCNC: 1.03 MG/DL (ref 0.6–1.1)
EOSINOPHIL # BLD AUTO: 0 X10'3 (ref 0–0.9)
EOSINOPHIL NFR BLD AUTO: 0.2 % (ref 0–6)
ERYTHROCYTE [DISTWIDTH] IN BLOOD BY AUTOMATED COUNT: 13.6 % (ref 11.5–14.5)
GFR SERPL CREATININE-BSD FRML MDRD: 73 ML/MIN
GLUCOSE SERPL-MCNC: 105 MG/DL (ref 70–104)
HCT VFR BLD AUTO: 35.5 % (ref 42–52)
HGB BLD-MCNC: 11.9 G/DL (ref 14–17.9)
LYMPHOCYTES # BLD AUTO: 0.9 X10'3 (ref 1.1–4.8)
LYMPHOCYTES NFR BLD AUTO: 8.3 % (ref 21–51)
MAGNESIUM SERPL-MCNC: 2.1 MG/DL (ref 1.5–2.4)
MCH RBC QN AUTO: 32 PG (ref 27–31)
MCHC RBC AUTO-ENTMCNC: 33.6 G/DL (ref 33–36.5)
MCV RBC AUTO: 95.2 FL (ref 78–98)
MONOCYTES # BLD AUTO: 0.6 X10'3 (ref 0–0.9)
MONOCYTES NFR BLD AUTO: 5.4 % (ref 2–12)
NEUTROPHILS # BLD AUTO: 9.1 X10'3 (ref 1.8–7.7)
NEUTROPHILS NFR BLD AUTO: 85.9 % (ref 42–75)
PHOSPHATE SERPL-MCNC: 2.6 MG/DL (ref 2.3–4.5)
PLATELET # BLD AUTO: 189 X10'3 (ref 140–440)
PMV BLD AUTO: 8.1 FL (ref 7.4–10.4)
POTASSIUM SERPL-SCNC: 3.9 MMOL/L (ref 3.5–5.1)
RBC # BLD AUTO: 3.73 X10'6 (ref 4.7–6.1)
SODIUM SERPL-SCNC: 140 MMOL/L (ref 135–145)
WBC # BLD AUTO: 10.6 X10'3 (ref 4.5–11)

## 2021-12-03 RX ADMIN — Medication SCH MMU: at 19:29

## 2021-12-03 RX ADMIN — MORPHINE SULFATE PRN MG: 4 INJECTION, SOLUTION INTRAMUSCULAR; INTRAVENOUS at 23:45

## 2021-12-03 RX ADMIN — SODIUM CHLORIDE SCH MG: 9 INJECTION INTRAMUSCULAR; INTRAVENOUS; SUBCUTANEOUS at 08:49

## 2021-12-03 RX ADMIN — HEPARIN SODIUM SCH UNIT: 5000 INJECTION, SOLUTION INTRAVENOUS; SUBCUTANEOUS at 15:56

## 2021-12-03 RX ADMIN — PYRIDOSTIGMINE BROMIDE SCH MG: 60 TABLET ORAL at 19:29

## 2021-12-03 RX ADMIN — MORPHINE SULFATE PRN MG: 4 INJECTION, SOLUTION INTRAMUSCULAR; INTRAVENOUS at 05:32

## 2021-12-03 RX ADMIN — TAZOBACTAM SODIUM AND PIPERACILLIN SODIUM SCH MLS/HR: 375; 3 INJECTION, SOLUTION INTRAVENOUS at 00:11

## 2021-12-03 RX ADMIN — TAZOBACTAM SODIUM AND PIPERACILLIN SODIUM SCH MLS/HR: 375; 3 INJECTION, SOLUTION INTRAVENOUS at 08:49

## 2021-12-03 RX ADMIN — HEPARIN SODIUM SCH UNIT: 5000 INJECTION, SOLUTION INTRAVENOUS; SUBCUTANEOUS at 00:12

## 2021-12-03 RX ADMIN — PYRIDOSTIGMINE BROMIDE SCH MG: 60 TABLET ORAL at 14:06

## 2021-12-03 RX ADMIN — HEPARIN SODIUM SCH UNIT: 5000 INJECTION, SOLUTION INTRAVENOUS; SUBCUTANEOUS at 08:51

## 2021-12-03 RX ADMIN — PYRIDOSTIGMINE BROMIDE SCH MG: 60 TABLET ORAL at 08:49

## 2021-12-03 RX ADMIN — TAZOBACTAM SODIUM AND PIPERACILLIN SODIUM SCH MLS/HR: 375; 3 INJECTION, SOLUTION INTRAVENOUS at 15:55

## 2021-12-03 RX ADMIN — TAZOBACTAM SODIUM AND PIPERACILLIN SODIUM SCH MLS/HR: 375; 3 INJECTION, SOLUTION INTRAVENOUS at 23:44

## 2021-12-03 NOTE — NUR
Problems reprioritized. Patient report given, questions answered & plan of care reviewed 
with Michelle NGUYEN.

## 2021-12-03 NOTE — NUR
pt is a 62 yo male, admitted 11/20/2021, day 3 of hospitalization, full code, NDA, no 
isolation. Pt c/o abdominal pain, CT reveals Lg Bowel obstruction with perforation. On 
11/30/2021 pt had sigmoid colon resection with colostomy. Currently, pt is AAO times 4, 
moves all extremities, ambulated in [place], follows all commands. C/O pain 5/10, Morphine 2 mg 
iv given at 2030, well tolerated. HR 64 /85, good pulses, trace generalized edema. SQ 
heparin for DVT prophylaxis. Peripheral lines RH and LH f/p. no IVF infusing. Note Rt groin 
had a central line that had been removed today, no hematoma noted. RR 25-35, non labored, 
noted fine crackles at bases. Non labored, equal symmetrical.  On day shift Pt had been give 
Lasix 40 mg IV and diuresed over 2L. Abdomen tender with midline incision dressing to be 
changed by surgeon. CDI. Normo-active bowel sounds. Colostomy draining brown stool. Pt 
advanced to regular diet, well tolerated.  PEPCID for GI prophylaxis. Glucose check Q 6 
hours. Glucose at 2000 128. bladder non distended, voiding CYU. other than midline abdominal 
 incision, Skin intact. ABX Zosyn. Labs with in normal limits. Pt remains safe. Continue to 
monitor. Pt being transferred to PCU room 3023c.

-------------------------------------------------------------------------------

Addendum: 12/03/21 at 2153 by Jd Costa RN

-------------------------------------------------------------------------------

Note pt admitted 11/30/2021 not 11/20/2021

-------------------------------------------------------------------------------

Addendum: 12/03/21 at 2211 by Jd Costa RN

-------------------------------------------------------------------------------

Report called to Harleen GODOY Pt placed on telemetry box #25

## 2021-12-03 NOTE — NUR
Pt's right groin central line with bleeding/oozing. Dr. Flynn notified. new order: ok to D/C 
Central Line. Line discontinued, tip intact.  pressure held x15 mins.  5 2x2 soaked, 4x4 
unsoiled.  occlusive dressing placed. pt tolerated well. no s/sx complication or hematoma.

## 2021-12-03 NOTE — NUR
Problems reprioritized. Patient report given, questions answered & plan of care reviewed 
with Kian NGUYEN.  pt semi fowlers in bed, tinkering with his cell phone. breathing even and 
non labored. no s/sx acute distress.  

-------------------------------------------------------------------------------

Addendum: 12/03/21 at 1818 by Michelle Armendariz RN

-------------------------------------------------------------------------------

correction.  at 0610, report received from Kian NGUYEN, not received.

## 2021-12-03 NOTE — NUR
Problems reprioritized. Patient report given, questions answered & plan of care reviewed 
with Aldo NGUYEN. Pt sitting up in bed eating dinner. no sob. no s/sx acute distress

## 2021-12-04 VITALS — SYSTOLIC BLOOD PRESSURE: 122 MMHG | DIASTOLIC BLOOD PRESSURE: 79 MMHG

## 2021-12-04 VITALS — DIASTOLIC BLOOD PRESSURE: 58 MMHG | SYSTOLIC BLOOD PRESSURE: 123 MMHG

## 2021-12-04 VITALS — DIASTOLIC BLOOD PRESSURE: 61 MMHG | SYSTOLIC BLOOD PRESSURE: 113 MMHG

## 2021-12-04 VITALS — DIASTOLIC BLOOD PRESSURE: 64 MMHG | SYSTOLIC BLOOD PRESSURE: 114 MMHG

## 2021-12-04 VITALS — SYSTOLIC BLOOD PRESSURE: 125 MMHG | DIASTOLIC BLOOD PRESSURE: 70 MMHG

## 2021-12-04 VITALS — DIASTOLIC BLOOD PRESSURE: 76 MMHG | SYSTOLIC BLOOD PRESSURE: 118 MMHG

## 2021-12-04 LAB
ALBUMIN SERPL BCP-MCNC: 2.4 G/DL (ref 3.4–5)
ANION GAP SERPL CALCULATED.3IONS-SCNC: 9 MMOL/L (ref 8–16)
BASOPHILS # BLD AUTO: 0.1 X10'3 (ref 0–0.2)
BASOPHILS NFR BLD AUTO: 0.4 % (ref 0–1)
BUN SERPL-MCNC: 21 MG/DL (ref 7–18)
BUN/CREAT SERPL: 17.2 (ref 5.4–32)
CALCIUM SERPL-MCNC: 8.8 MG/DL (ref 8.5–10.1)
CHLORIDE SERPL-SCNC: 106 MMOL/L (ref 99–107)
CO2 SERPL-SCNC: 31.3 MMOL/L (ref 24–32)
CREAT SERPL-MCNC: 1.22 MG/DL (ref 0.6–1.1)
EOSINOPHIL # BLD AUTO: 0.1 X10'3 (ref 0–0.9)
EOSINOPHIL NFR BLD AUTO: 0.9 % (ref 0–6)
ERYTHROCYTE [DISTWIDTH] IN BLOOD BY AUTOMATED COUNT: 13.8 % (ref 11.5–14.5)
GFR SERPL CREATININE-BSD FRML MDRD: 60 ML/MIN
GLUCOSE SERPL-MCNC: 90 MG/DL (ref 70–104)
HCT VFR BLD AUTO: 38.3 % (ref 42–52)
HGB BLD-MCNC: 13.1 G/DL (ref 14–17.9)
LYMPHOCYTES # BLD AUTO: 2.3 X10'3 (ref 1.1–4.8)
LYMPHOCYTES NFR BLD AUTO: 18.9 % (ref 21–51)
MAGNESIUM SERPL-MCNC: 2.3 MG/DL (ref 1.5–2.4)
MCH RBC QN AUTO: 32.2 PG (ref 27–31)
MCHC RBC AUTO-ENTMCNC: 34.1 G/DL (ref 33–36.5)
MCV RBC AUTO: 94.5 FL (ref 78–98)
MONOCYTES # BLD AUTO: 0.9 X10'3 (ref 0–0.9)
MONOCYTES NFR BLD AUTO: 7.3 % (ref 2–12)
NEUTROPHILS # BLD AUTO: 8.9 X10'3 (ref 1.8–7.7)
NEUTROPHILS NFR BLD AUTO: 72.5 % (ref 42–75)
PHOSPHATE SERPL-MCNC: 2.6 MG/DL (ref 2.3–4.5)
PLATELET # BLD AUTO: 268 X10'3 (ref 140–440)
PMV BLD AUTO: 8 FL (ref 7.4–10.4)
POTASSIUM SERPL-SCNC: 3.2 MMOL/L (ref 3.5–5.1)
RBC # BLD AUTO: 4.05 X10'6 (ref 4.7–6.1)
SODIUM SERPL-SCNC: 146 MMOL/L (ref 135–145)
WBC # BLD AUTO: 12.2 X10'3 (ref 4.5–11)

## 2021-12-04 RX ADMIN — PYRIDOSTIGMINE BROMIDE SCH MG: 60 TABLET ORAL at 14:02

## 2021-12-04 RX ADMIN — HEPARIN SODIUM SCH UNIT: 5000 INJECTION, SOLUTION INTRAVENOUS; SUBCUTANEOUS at 16:02

## 2021-12-04 RX ADMIN — PYRIDOSTIGMINE BROMIDE SCH MG: 60 TABLET ORAL at 20:26

## 2021-12-04 RX ADMIN — HEPARIN SODIUM SCH UNIT: 5000 INJECTION, SOLUTION INTRAVENOUS; SUBCUTANEOUS at 08:14

## 2021-12-04 RX ADMIN — MORPHINE SULFATE PRN MG: 4 INJECTION, SOLUTION INTRAMUSCULAR; INTRAVENOUS at 18:42

## 2021-12-04 RX ADMIN — Medication SCH MMU: at 18:50

## 2021-12-04 RX ADMIN — Medication SCH MMU: at 08:12

## 2021-12-04 RX ADMIN — TAZOBACTAM SODIUM AND PIPERACILLIN SODIUM SCH MLS/HR: 375; 3 INJECTION, SOLUTION INTRAVENOUS at 10:55

## 2021-12-04 RX ADMIN — TAZOBACTAM SODIUM AND PIPERACILLIN SODIUM SCH MLS/HR: 375; 3 INJECTION, SOLUTION INTRAVENOUS at 16:46

## 2021-12-04 RX ADMIN — PYRIDOSTIGMINE BROMIDE SCH MG: 60 TABLET ORAL at 08:12

## 2021-12-04 RX ADMIN — HEPARIN SODIUM SCH UNIT: 5000 INJECTION, SOLUTION INTRAVENOUS; SUBCUTANEOUS at 01:23

## 2021-12-04 NOTE — NUR
F/u 12/4: Pt advanced to low-residue diet last night per MD from initial 

clear/full liquids on 12/3; cleared by SLP per EMR. Colostomy output 

present per EMR though no volume documentation at this time. Pt PO 50% avg 

breakfast w/ prior 50-75% initial liquids yesterday partially meeting 

needs. Pt/wife seen by RD for written/verbal colostomy diet ed w/ RD 

contact information provided. RD encouraged pt/wife to contact dietitian's 

office if further questions/concerns. Pt reports lower appetite w/ 

significant pain from abdominal surgery as well as hx gout which presents 

as abdominal pain as well. Wife reports pt drinks Ensure Max at home vs 

other protein drinks since oral/facial muscles weaken at times r/t 

myasthenia gravis hx. Pt declines Ensures this admit though is agreeable 

to chocolate Jim shake BIDBL; MD notified. Pt is agreeable to soup and 

chocolate pudding WS tonight given current pain level/low appetite. Will 

continue to monitor for PO/ONS trends and additional nutrition 

interventions this admit.

Rec:

1. continue low-residue diet per SLP/MD recs; encourage PO

2. chocolate Jim shake BIDLD for wound healing needs; pending MD 

verification in EMR

3. bowel care per MD post-op

4. weekly wts

-------------------------------------------------------------------------------

Addendum: 12/04/21 at 1656 by Keaton Camacho RD

-------------------------------------------------------------------------------

Amended: Links added.

## 2021-12-04 NOTE — NUR
Lab called with a positive blood culture for Rods,  informed via a phone call at 
05:10, no new orders given.

## 2021-12-04 NOTE — NUR
Pt transferred from the ICU into PCU room 3025u. Patient oriented to the room, call light 
within reach, bed in lower position, will continue to monitor and report any changes.

## 2021-12-05 VITALS — DIASTOLIC BLOOD PRESSURE: 70 MMHG | SYSTOLIC BLOOD PRESSURE: 139 MMHG

## 2021-12-05 VITALS — SYSTOLIC BLOOD PRESSURE: 120 MMHG | DIASTOLIC BLOOD PRESSURE: 71 MMHG

## 2021-12-05 VITALS — DIASTOLIC BLOOD PRESSURE: 77 MMHG | SYSTOLIC BLOOD PRESSURE: 126 MMHG

## 2021-12-05 VITALS — SYSTOLIC BLOOD PRESSURE: 108 MMHG | DIASTOLIC BLOOD PRESSURE: 72 MMHG

## 2021-12-05 VITALS — DIASTOLIC BLOOD PRESSURE: 77 MMHG | SYSTOLIC BLOOD PRESSURE: 130 MMHG

## 2021-12-05 VITALS — SYSTOLIC BLOOD PRESSURE: 113 MMHG | DIASTOLIC BLOOD PRESSURE: 52 MMHG

## 2021-12-05 LAB
ALBUMIN SERPL BCP-MCNC: 2.5 G/DL (ref 3.4–5)
ANION GAP SERPL CALCULATED.3IONS-SCNC: 5 MMOL/L (ref 8–16)
BASOPHILS # BLD AUTO: 0 X10'3 (ref 0–0.2)
BASOPHILS NFR BLD AUTO: 0.3 % (ref 0–1)
BUN SERPL-MCNC: 18 MG/DL (ref 7–18)
BUN/CREAT SERPL: 14 (ref 5.4–32)
CALCIUM SERPL-MCNC: 9.1 MG/DL (ref 8.5–10.1)
CHLORIDE SERPL-SCNC: 106 MMOL/L (ref 99–107)
CO2 SERPL-SCNC: 30.9 MMOL/L (ref 24–32)
CREAT SERPL-MCNC: 1.29 MG/DL (ref 0.6–1.1)
EOSINOPHIL # BLD AUTO: 0.1 X10'3 (ref 0–0.9)
EOSINOPHIL NFR BLD AUTO: 1.4 % (ref 0–6)
ERYTHROCYTE [DISTWIDTH] IN BLOOD BY AUTOMATED COUNT: 14 % (ref 11.5–14.5)
GFR SERPL CREATININE-BSD FRML MDRD: 57 ML/MIN
GLUCOSE SERPL-MCNC: 86 MG/DL (ref 70–104)
HCT VFR BLD AUTO: 40.4 % (ref 42–52)
HGB BLD-MCNC: 13.9 G/DL (ref 14–17.9)
LYMPHOCYTES # BLD AUTO: 2 X10'3 (ref 1.1–4.8)
LYMPHOCYTES NFR BLD AUTO: 20.9 % (ref 21–51)
MAGNESIUM SERPL-MCNC: 2.2 MG/DL (ref 1.5–2.4)
MCH RBC QN AUTO: 32.6 PG (ref 27–31)
MCHC RBC AUTO-ENTMCNC: 34.4 G/DL (ref 33–36.5)
MCV RBC AUTO: 94.8 FL (ref 78–98)
MONOCYTES # BLD AUTO: 0.7 X10'3 (ref 0–0.9)
MONOCYTES NFR BLD AUTO: 7 % (ref 2–12)
NEUTROPHILS # BLD AUTO: 6.8 X10'3 (ref 1.8–7.7)
NEUTROPHILS NFR BLD AUTO: 70.4 % (ref 42–75)
PHOSPHATE SERPL-MCNC: 3 MG/DL (ref 2.3–4.5)
PLATELET # BLD AUTO: 291 X10'3 (ref 140–440)
PMV BLD AUTO: 7.3 FL (ref 7.4–10.4)
POTASSIUM SERPL-SCNC: 3.4 MMOL/L (ref 3.5–5.1)
RBC # BLD AUTO: 4.26 X10'6 (ref 4.7–6.1)
SODIUM SERPL-SCNC: 142 MMOL/L (ref 135–145)
WBC # BLD AUTO: 9.7 X10'3 (ref 4.5–11)

## 2021-12-05 RX ADMIN — HEPARIN SODIUM SCH UNIT: 5000 INJECTION, SOLUTION INTRAVENOUS; SUBCUTANEOUS at 00:00

## 2021-12-05 RX ADMIN — TAZOBACTAM SODIUM AND PIPERACILLIN SODIUM SCH MLS/HR: 375; 3 INJECTION, SOLUTION INTRAVENOUS at 16:46

## 2021-12-05 RX ADMIN — PYRIDOSTIGMINE BROMIDE SCH MG: 60 TABLET ORAL at 20:17

## 2021-12-05 RX ADMIN — TAZOBACTAM SODIUM AND PIPERACILLIN SODIUM SCH MLS/HR: 375; 3 INJECTION, SOLUTION INTRAVENOUS at 00:00

## 2021-12-05 RX ADMIN — PYRIDOSTIGMINE BROMIDE SCH MG: 60 TABLET ORAL at 08:00

## 2021-12-05 RX ADMIN — Medication SCH MMU: at 20:17

## 2021-12-05 RX ADMIN — MORPHINE SULFATE PRN MG: 4 INJECTION, SOLUTION INTRAMUSCULAR; INTRAVENOUS at 01:11

## 2021-12-05 RX ADMIN — Medication SCH MMU: at 08:59

## 2021-12-05 RX ADMIN — HEPARIN SODIUM SCH UNIT: 5000 INJECTION, SOLUTION INTRAVENOUS; SUBCUTANEOUS at 09:02

## 2021-12-05 RX ADMIN — TAZOBACTAM SODIUM AND PIPERACILLIN SODIUM SCH MLS/HR: 375; 3 INJECTION, SOLUTION INTRAVENOUS at 08:59

## 2021-12-05 RX ADMIN — HEPARIN SODIUM SCH UNIT: 5000 INJECTION, SOLUTION INTRAVENOUS; SUBCUTANEOUS at 16:47

## 2021-12-05 RX ADMIN — PYRIDOSTIGMINE BROMIDE SCH MG: 60 TABLET ORAL at 16:01

## 2021-12-05 NOTE — NUR
Problems reprioritized. Patient report given, questions answered & plan of care reviewed 
with PATRICK Harris.

## 2021-12-05 NOTE — NUR
Phone Call: Dr. Conklin

Notified Rubin Palafox if the patient will be discharge today need to change his surgery 
dressing before that. Dr. norris approved me to change the dressing. The charge nurse Saurabh 
was notified about it too.

## 2021-12-05 NOTE — NUR
Per order of Dr. Love the old dressings was changed. Observed wound pink. wound was 
clean and the cover with kerlex, 4x4 gauze. and dressing gauze.

## 2021-12-05 NOTE — NUR
PAGER ID:  [phone] 

MESSAGE:  DAVE ON [email or web address], DR. LEYVA WANTS TO HOLD 3023C FROM DISCHARGE. I WILL CANCEL 
ORDER. THANK YOU

## 2021-12-05 NOTE — NUR
Patient in room PCU 3023. I have received report from PATRICK Matos   and had the opportunity 
to ask questions and assume patient care.

## 2021-12-06 VITALS — SYSTOLIC BLOOD PRESSURE: 142 MMHG | DIASTOLIC BLOOD PRESSURE: 68 MMHG

## 2021-12-06 VITALS — SYSTOLIC BLOOD PRESSURE: 130 MMHG | DIASTOLIC BLOOD PRESSURE: 64 MMHG

## 2021-12-06 VITALS — DIASTOLIC BLOOD PRESSURE: 70 MMHG | SYSTOLIC BLOOD PRESSURE: 124 MMHG

## 2021-12-06 VITALS — DIASTOLIC BLOOD PRESSURE: 73 MMHG | SYSTOLIC BLOOD PRESSURE: 129 MMHG

## 2021-12-06 LAB
ALBUMIN SERPL BCP-MCNC: 2.5 G/DL (ref 3.4–5)
ANION GAP SERPL CALCULATED.3IONS-SCNC: 3 MMOL/L (ref 8–16)
BASOPHILS # BLD AUTO: 0 X10'3 (ref 0–0.2)
BASOPHILS NFR BLD AUTO: 0.3 % (ref 0–1)
BUN SERPL-MCNC: 18 MG/DL (ref 7–18)
BUN/CREAT SERPL: 13.8 (ref 5.4–32)
CALCIUM SERPL-MCNC: 8.9 MG/DL (ref 8.5–10.1)
CHLORIDE SERPL-SCNC: 107 MMOL/L (ref 99–107)
CO2 SERPL-SCNC: 31.7 MMOL/L (ref 24–32)
CREAT SERPL-MCNC: 1.3 MG/DL (ref 0.6–1.1)
EOSINOPHIL # BLD AUTO: 0.1 X10'3 (ref 0–0.9)
EOSINOPHIL NFR BLD AUTO: 1.4 % (ref 0–6)
ERYTHROCYTE [DISTWIDTH] IN BLOOD BY AUTOMATED COUNT: 14 % (ref 11.5–14.5)
GFR SERPL CREATININE-BSD FRML MDRD: 56 ML/MIN
GLUCOSE SERPL-MCNC: 92 MG/DL (ref 70–104)
HCT VFR BLD AUTO: 42 % (ref 42–52)
HGB BLD-MCNC: 14.3 G/DL (ref 14–17.9)
LYMPHOCYTES # BLD AUTO: 1.7 X10'3 (ref 1.1–4.8)
LYMPHOCYTES NFR BLD AUTO: 17 % (ref 21–51)
MAGNESIUM SERPL-MCNC: 2.4 MG/DL (ref 1.5–2.4)
MCH RBC QN AUTO: 32.2 PG (ref 27–31)
MCHC RBC AUTO-ENTMCNC: 34.2 G/DL (ref 33–36.5)
MCV RBC AUTO: 94.2 FL (ref 78–98)
MONOCYTES # BLD AUTO: 0.7 X10'3 (ref 0–0.9)
MONOCYTES NFR BLD AUTO: 7.2 % (ref 2–12)
NEUTROPHILS # BLD AUTO: 7.6 X10'3 (ref 1.8–7.7)
NEUTROPHILS NFR BLD AUTO: 74.1 % (ref 42–75)
PHOSPHATE SERPL-MCNC: 3.2 MG/DL (ref 2.3–4.5)
PLATELET # BLD AUTO: 330 X10'3 (ref 140–440)
PMV BLD AUTO: 7.2 FL (ref 7.4–10.4)
POTASSIUM SERPL-SCNC: 3.4 MMOL/L (ref 3.5–5.1)
RBC # BLD AUTO: 4.46 X10'6 (ref 4.7–6.1)
SODIUM SERPL-SCNC: 142 MMOL/L (ref 135–145)
WBC # BLD AUTO: 10.2 X10'3 (ref 4.5–11)

## 2021-12-06 RX ADMIN — HEPARIN SODIUM SCH UNIT: 5000 INJECTION, SOLUTION INTRAVENOUS; SUBCUTANEOUS at 23:42

## 2021-12-06 RX ADMIN — HEPARIN SODIUM SCH UNIT: 5000 INJECTION, SOLUTION INTRAVENOUS; SUBCUTANEOUS at 08:39

## 2021-12-06 RX ADMIN — POTASSIUM CHLORIDE PRN MEQ: 1500 TABLET, EXTENDED RELEASE ORAL at 19:59

## 2021-12-06 RX ADMIN — TAZOBACTAM SODIUM AND PIPERACILLIN SODIUM SCH MLS/HR: 375; 3 INJECTION, SOLUTION INTRAVENOUS at 23:46

## 2021-12-06 RX ADMIN — HEPARIN SODIUM SCH UNIT: 5000 INJECTION, SOLUTION INTRAVENOUS; SUBCUTANEOUS at 16:10

## 2021-12-06 RX ADMIN — PYRIDOSTIGMINE BROMIDE SCH MG: 60 TABLET ORAL at 08:37

## 2021-12-06 RX ADMIN — TAZOBACTAM SODIUM AND PIPERACILLIN SODIUM SCH MLS/HR: 375; 3 INJECTION, SOLUTION INTRAVENOUS at 08:36

## 2021-12-06 RX ADMIN — Medication SCH MMU: at 19:59

## 2021-12-06 RX ADMIN — HEPARIN SODIUM SCH UNIT: 5000 INJECTION, SOLUTION INTRAVENOUS; SUBCUTANEOUS at 00:00

## 2021-12-06 RX ADMIN — TAZOBACTAM SODIUM AND PIPERACILLIN SODIUM SCH MLS/HR: 375; 3 INJECTION, SOLUTION INTRAVENOUS at 16:10

## 2021-12-06 RX ADMIN — Medication SCH MMU: at 08:37

## 2021-12-06 RX ADMIN — PYRIDOSTIGMINE BROMIDE SCH MG: 60 TABLET ORAL at 13:22

## 2021-12-06 RX ADMIN — PYRIDOSTIGMINE BROMIDE SCH MG: 60 TABLET ORAL at 22:23

## 2021-12-06 RX ADMIN — TAZOBACTAM SODIUM AND PIPERACILLIN SODIUM SCH MLS/HR: 375; 3 INJECTION, SOLUTION INTRAVENOUS at 00:39

## 2021-12-07 VITALS — SYSTOLIC BLOOD PRESSURE: 123 MMHG | DIASTOLIC BLOOD PRESSURE: 70 MMHG

## 2021-12-07 VITALS — SYSTOLIC BLOOD PRESSURE: 118 MMHG | DIASTOLIC BLOOD PRESSURE: 76 MMHG

## 2021-12-07 VITALS — SYSTOLIC BLOOD PRESSURE: 121 MMHG | DIASTOLIC BLOOD PRESSURE: 65 MMHG

## 2021-12-07 VITALS — SYSTOLIC BLOOD PRESSURE: 104 MMHG | DIASTOLIC BLOOD PRESSURE: 58 MMHG

## 2021-12-07 VITALS — DIASTOLIC BLOOD PRESSURE: 75 MMHG | SYSTOLIC BLOOD PRESSURE: 118 MMHG

## 2021-12-07 LAB
ALBUMIN SERPL BCP-MCNC: 2.6 G/DL (ref 3.4–5)
ANION GAP SERPL CALCULATED.3IONS-SCNC: -7 MMOL/L (ref 8–16)
BASOPHILS # BLD AUTO: 0.1 X10'3 (ref 0–0.2)
BASOPHILS NFR BLD AUTO: 0.5 % (ref 0–1)
BUN SERPL-MCNC: 18 MG/DL (ref 7–18)
BUN/CREAT SERPL: 12 (ref 5.4–32)
CALCIUM SERPL-MCNC: 9.2 MG/DL (ref 8.5–10.1)
CHLORIDE SERPL-SCNC: 103 MMOL/L (ref 99–107)
CO2 SERPL-SCNC: 29.6 MMOL/L (ref 24–32)
CREAT SERPL-MCNC: 1.5 MG/DL (ref 0.6–1.1)
EOSINOPHIL # BLD AUTO: 0.1 X10'3 (ref 0–0.9)
EOSINOPHIL NFR BLD AUTO: 0.8 % (ref 0–6)
ERYTHROCYTE [DISTWIDTH] IN BLOOD BY AUTOMATED COUNT: 13.9 % (ref 11.5–14.5)
GFR SERPL CREATININE-BSD FRML MDRD: 48 ML/MIN
GLUCOSE SERPL-MCNC: 93 MG/DL (ref 70–104)
HCT VFR BLD AUTO: 43.3 % (ref 42–52)
HGB BLD-MCNC: 14.6 G/DL (ref 14–17.9)
LYMPHOCYTES # BLD AUTO: 1.8 X10'3 (ref 1.1–4.8)
LYMPHOCYTES NFR BLD AUTO: 15.1 % (ref 21–51)
MAGNESIUM SERPL-MCNC: 2.2 MG/DL (ref 1.5–2.4)
MCH RBC QN AUTO: 32 PG (ref 27–31)
MCHC RBC AUTO-ENTMCNC: 33.8 G/DL (ref 33–36.5)
MCV RBC AUTO: 94.8 FL (ref 78–98)
MONOCYTES # BLD AUTO: 0.8 X10'3 (ref 0–0.9)
MONOCYTES NFR BLD AUTO: 6.9 % (ref 2–12)
NEUTROPHILS # BLD AUTO: 8.9 X10'3 (ref 1.8–7.7)
NEUTROPHILS NFR BLD AUTO: 76.7 % (ref 42–75)
PHOSPHATE SERPL-MCNC: 3 MG/DL (ref 2.3–4.5)
PLATELET # BLD AUTO: 350 X10'3 (ref 140–440)
PMV BLD AUTO: 7.3 FL (ref 7.4–10.4)
POTASSIUM SERPL-SCNC: 3.2 MMOL/L (ref 3.5–5.1)
RBC # BLD AUTO: 4.56 X10'6 (ref 4.7–6.1)
SODIUM SERPL-SCNC: 126 MMOL/L (ref 135–145)
WBC # BLD AUTO: 11.7 X10'3 (ref 4.5–11)

## 2021-12-07 RX ADMIN — PYRIDOSTIGMINE BROMIDE SCH MG: 60 TABLET ORAL at 08:24

## 2021-12-07 RX ADMIN — POTASSIUM CHLORIDE PRN MEQ: 1500 TABLET, EXTENDED RELEASE ORAL at 08:24

## 2021-12-07 RX ADMIN — Medication SCH MMU: at 20:34

## 2021-12-07 RX ADMIN — MORPHINE SULFATE PRN MG: 4 INJECTION, SOLUTION INTRAMUSCULAR; INTRAVENOUS at 16:29

## 2021-12-07 RX ADMIN — PYRIDOSTIGMINE BROMIDE SCH MG: 60 TABLET ORAL at 16:25

## 2021-12-07 RX ADMIN — TAZOBACTAM SODIUM AND PIPERACILLIN SODIUM SCH MLS/HR: 375; 3 INJECTION, SOLUTION INTRAVENOUS at 08:40

## 2021-12-07 RX ADMIN — PYRIDOSTIGMINE BROMIDE SCH MG: 60 TABLET ORAL at 20:34

## 2021-12-07 RX ADMIN — HEPARIN SODIUM SCH UNIT: 5000 INJECTION, SOLUTION INTRAVENOUS; SUBCUTANEOUS at 16:25

## 2021-12-07 RX ADMIN — TAZOBACTAM SODIUM AND PIPERACILLIN SODIUM SCH MLS/HR: 375; 3 INJECTION, SOLUTION INTRAVENOUS at 16:25

## 2021-12-07 RX ADMIN — HEPARIN SODIUM SCH UNIT: 5000 INJECTION, SOLUTION INTRAVENOUS; SUBCUTANEOUS at 08:25

## 2021-12-07 RX ADMIN — Medication SCH MMU: at 08:24

## 2021-12-07 NOTE — NUR
Patient in room PCU 3023. I have received report from Sana NGUYEN   and had the opportunity to 
ask questions and assume patient care.

## 2021-12-07 NOTE — NUR
Problems reprioritized. Patient report given, questions answered & plan of care reviewed 
with Tracie NGUYEN.

## 2021-12-07 NOTE — NUR
F/u 12/4: Pt continues on low fiber diet w/ improved PO intake, avg 

% of meals since 12/5, though not meeting increased protein needs. 

Jim Shakes BID still unverified in EMR though pt can still benefit from 

them, RN notified. Pt noted to be independent w/ meals. LBM 12/6 though no 

ostomy output documented. Will continue to monitor and make 

recommendations as appropriate. 

Rec:

1. continue low-residue diet per SLP/MD recs; encourage PO

2. chocolate Jim shake BIDLD for wound healing needs; pending MD

verification in EMR

3. bowel care per MD post-op

4. weekly wts

-------------------------------------------------------------------------------

Addendum: 12/07/21 at 1204 by Julio Garrett RD

-------------------------------------------------------------------------------

Amended: Links added.

## 2021-12-07 NOTE — NUR
Problems reprioritized. Patient report given, questions answered & plan of care reviewed 
with OLAYINKA. 

-------------------------------------------------------------------------------

Addendum: 12/07/21 at 0644 by Jeramie Sorto RN

-------------------------------------------------------------------------------

Amended: Links added.

## 2021-12-07 NOTE — NUR
Patient in room PCU 3023. I have received report from LEON NGUYEN   and had the opportunity to 
ask questions and assume patient care.

## 2021-12-08 VITALS — DIASTOLIC BLOOD PRESSURE: 62 MMHG | SYSTOLIC BLOOD PRESSURE: 107 MMHG

## 2021-12-08 VITALS — DIASTOLIC BLOOD PRESSURE: 63 MMHG | SYSTOLIC BLOOD PRESSURE: 113 MMHG

## 2021-12-08 VITALS — DIASTOLIC BLOOD PRESSURE: 64 MMHG | SYSTOLIC BLOOD PRESSURE: 110 MMHG

## 2021-12-08 VITALS — DIASTOLIC BLOOD PRESSURE: 52 MMHG | SYSTOLIC BLOOD PRESSURE: 94 MMHG

## 2021-12-08 VITALS — SYSTOLIC BLOOD PRESSURE: 106 MMHG | DIASTOLIC BLOOD PRESSURE: 70 MMHG

## 2021-12-08 VITALS — SYSTOLIC BLOOD PRESSURE: 107 MMHG | DIASTOLIC BLOOD PRESSURE: 69 MMHG

## 2021-12-08 LAB
ALBUMIN SERPL BCP-MCNC: 2.5 G/DL (ref 3.4–5)
ANION GAP SERPL CALCULATED.3IONS-SCNC: 8 MMOL/L (ref 8–16)
BASOPHILS # BLD AUTO: 0.1 X10'3 (ref 0–0.2)
BASOPHILS NFR BLD AUTO: 0.5 % (ref 0–1)
BUN SERPL-MCNC: 18 MG/DL (ref 7–18)
BUN/CREAT SERPL: 13.5 (ref 5.4–32)
CALCIUM SERPL-MCNC: 9.1 MG/DL (ref 8.5–10.1)
CHLORIDE SERPL-SCNC: 106 MMOL/L (ref 99–107)
CO2 SERPL-SCNC: 26.9 MMOL/L (ref 24–32)
CREAT SERPL-MCNC: 1.33 MG/DL (ref 0.6–1.1)
EOSINOPHIL # BLD AUTO: 0.1 X10'3 (ref 0–0.9)
EOSINOPHIL NFR BLD AUTO: 0.8 % (ref 0–6)
ERYTHROCYTE [DISTWIDTH] IN BLOOD BY AUTOMATED COUNT: 13.9 % (ref 11.5–14.5)
GFR SERPL CREATININE-BSD FRML MDRD: 55 ML/MIN
GLUCOSE SERPL-MCNC: 88 MG/DL (ref 70–104)
HCT VFR BLD AUTO: 43.3 % (ref 42–52)
HGB BLD-MCNC: 14.5 G/DL (ref 14–17.9)
LYMPHOCYTES # BLD AUTO: 1.9 X10'3 (ref 1.1–4.8)
LYMPHOCYTES NFR BLD AUTO: 16.1 % (ref 21–51)
MAGNESIUM SERPL-MCNC: 2.3 MG/DL (ref 1.5–2.4)
MCH RBC QN AUTO: 31.9 PG (ref 27–31)
MCHC RBC AUTO-ENTMCNC: 33.6 G/DL (ref 33–36.5)
MCV RBC AUTO: 95.1 FL (ref 78–98)
MONOCYTES # BLD AUTO: 1 X10'3 (ref 0–0.9)
MONOCYTES NFR BLD AUTO: 8.3 % (ref 2–12)
NEUTROPHILS # BLD AUTO: 8.8 X10'3 (ref 1.8–7.7)
NEUTROPHILS NFR BLD AUTO: 74.3 % (ref 42–75)
PHOSPHATE SERPL-MCNC: 3.1 MG/DL (ref 2.3–4.5)
PLATELET # BLD AUTO: 344 X10'3 (ref 140–440)
PMV BLD AUTO: 7.7 FL (ref 7.4–10.4)
POTASSIUM SERPL-SCNC: 3.9 MMOL/L (ref 3.5–5.1)
RBC # BLD AUTO: 4.56 X10'6 (ref 4.7–6.1)
SODIUM SERPL-SCNC: 141 MMOL/L (ref 135–145)
WBC # BLD AUTO: 11.9 X10'3 (ref 4.5–11)

## 2021-12-08 RX ADMIN — POTASSIUM CHLORIDE PRN MEQ: 1500 TABLET, EXTENDED RELEASE ORAL at 04:30

## 2021-12-08 RX ADMIN — Medication SCH MMU: at 19:58

## 2021-12-08 RX ADMIN — PYRIDOSTIGMINE BROMIDE SCH MG: 60 TABLET ORAL at 08:41

## 2021-12-08 RX ADMIN — PYRIDOSTIGMINE BROMIDE SCH MG: 60 TABLET ORAL at 20:00

## 2021-12-08 RX ADMIN — Medication SCH MMU: at 08:40

## 2021-12-08 RX ADMIN — TAZOBACTAM SODIUM AND PIPERACILLIN SODIUM SCH MLS/HR: 375; 3 INJECTION, SOLUTION INTRAVENOUS at 08:40

## 2021-12-08 RX ADMIN — TAZOBACTAM SODIUM AND PIPERACILLIN SODIUM SCH MLS/HR: 375; 3 INJECTION, SOLUTION INTRAVENOUS at 16:00

## 2021-12-08 RX ADMIN — MORPHINE SULFATE PRN MG: 4 INJECTION, SOLUTION INTRAMUSCULAR; INTRAVENOUS at 04:34

## 2021-12-08 RX ADMIN — PYRIDOSTIGMINE BROMIDE SCH MG: 60 TABLET ORAL at 13:02

## 2021-12-08 RX ADMIN — MORPHINE SULFATE PRN MG: 4 INJECTION, SOLUTION INTRAMUSCULAR; INTRAVENOUS at 00:33

## 2021-12-08 RX ADMIN — MORPHINE SULFATE PRN MG: 4 INJECTION, SOLUTION INTRAMUSCULAR; INTRAVENOUS at 11:17

## 2021-12-08 RX ADMIN — TAZOBACTAM SODIUM AND PIPERACILLIN SODIUM SCH MLS/HR: 375; 3 INJECTION, SOLUTION INTRAVENOUS at 00:25

## 2021-12-08 RX ADMIN — POTASSIUM CHLORIDE PRN MEQ: 1500 TABLET, EXTENDED RELEASE ORAL at 00:24

## 2021-12-08 RX ADMIN — HEPARIN SODIUM SCH UNIT: 5000 INJECTION, SOLUTION INTRAVENOUS; SUBCUTANEOUS at 16:04

## 2021-12-08 RX ADMIN — HEPARIN SODIUM SCH UNIT: 5000 INJECTION, SOLUTION INTRAVENOUS; SUBCUTANEOUS at 08:42

## 2021-12-08 RX ADMIN — MORPHINE SULFATE PRN MG: 4 INJECTION, SOLUTION INTRAMUSCULAR; INTRAVENOUS at 20:02

## 2021-12-08 RX ADMIN — HEPARIN SODIUM SCH UNIT: 5000 INJECTION, SOLUTION INTRAVENOUS; SUBCUTANEOUS at 00:32

## 2021-12-08 NOTE — NUR
Problems reprioritized. Patient report given, questions answered & plan of care reviewed 
with Wander RN.

## 2021-12-08 NOTE — NUR
Problems reprioritized. Patient report given, questions answered & plan of care reviewed 
with Marcos NGUYEN.

## 2021-12-08 NOTE — NUR
when listening to abd sounds with stethoscope, pt states "if you get that anywhere near here 
i will break your glasses" (pointing to wound vac/colostomy

-------------------------------------------------------------------------------

Addendum: 12/08/21 at 0803 by Janell Walters - Student LEO

-------------------------------------------------------------------------------

Amended: Links added.

## 2021-12-08 NOTE — NUR
Patient in room PCU 3023. I have received report from Tracie NGUYEN   and had the opportunity to 
ask questions and assume patient care.

## 2021-12-09 VITALS — DIASTOLIC BLOOD PRESSURE: 73 MMHG | SYSTOLIC BLOOD PRESSURE: 103 MMHG

## 2021-12-09 VITALS — SYSTOLIC BLOOD PRESSURE: 109 MMHG | DIASTOLIC BLOOD PRESSURE: 74 MMHG

## 2021-12-09 VITALS — DIASTOLIC BLOOD PRESSURE: 74 MMHG | SYSTOLIC BLOOD PRESSURE: 109 MMHG

## 2021-12-09 LAB
ALBUMIN SERPL BCP-MCNC: 2.7 G/DL (ref 3.4–5)
ANION GAP SERPL CALCULATED.3IONS-SCNC: 10 MMOL/L (ref 8–16)
BASOPHILS # BLD AUTO: 0.1 X10'3 (ref 0–0.2)
BASOPHILS NFR BLD AUTO: 0.7 % (ref 0–1)
BUN SERPL-MCNC: 22 MG/DL (ref 7–18)
BUN/CREAT SERPL: 14 (ref 5.4–32)
CALCIUM SERPL-MCNC: 9.7 MG/DL (ref 8.5–10.1)
CHLORIDE SERPL-SCNC: 103 MMOL/L (ref 99–107)
CO2 SERPL-SCNC: 28.2 MMOL/L (ref 24–32)
CREAT SERPL-MCNC: 1.57 MG/DL (ref 0.6–1.1)
EOSINOPHIL # BLD AUTO: 0.1 X10'3 (ref 0–0.9)
EOSINOPHIL NFR BLD AUTO: 0.9 % (ref 0–6)
ERYTHROCYTE [DISTWIDTH] IN BLOOD BY AUTOMATED COUNT: 14 % (ref 11.5–14.5)
GFR SERPL CREATININE-BSD FRML MDRD: 45 ML/MIN
GLUCOSE SERPL-MCNC: 85 MG/DL (ref 70–104)
HCT VFR BLD AUTO: 45.5 % (ref 42–52)
HGB BLD-MCNC: 15.5 G/DL (ref 14–17.9)
LYMPHOCYTES # BLD AUTO: 1.9 X10'3 (ref 1.1–4.8)
LYMPHOCYTES NFR BLD AUTO: 17.5 % (ref 21–51)
MAGNESIUM SERPL-MCNC: 2.4 MG/DL (ref 1.5–2.4)
MCH RBC QN AUTO: 32.4 PG (ref 27–31)
MCHC RBC AUTO-ENTMCNC: 34.1 G/DL (ref 33–36.5)
MCV RBC AUTO: 95.1 FL (ref 78–98)
MONOCYTES # BLD AUTO: 1 X10'3 (ref 0–0.9)
MONOCYTES NFR BLD AUTO: 8.8 % (ref 2–12)
NEUTROPHILS # BLD AUTO: 7.8 X10'3 (ref 1.8–7.7)
NEUTROPHILS NFR BLD AUTO: 72.1 % (ref 42–75)
PHOSPHATE SERPL-MCNC: 3.4 MG/DL (ref 2.3–4.5)
PLATELET # BLD AUTO: 389 X10'3 (ref 140–440)
PMV BLD AUTO: 7.4 FL (ref 7.4–10.4)
POTASSIUM SERPL-SCNC: 4 MMOL/L (ref 3.5–5.1)
RBC # BLD AUTO: 4.79 X10'6 (ref 4.7–6.1)
SODIUM SERPL-SCNC: 141 MMOL/L (ref 135–145)
WBC # BLD AUTO: 10.8 X10'3 (ref 4.5–11)

## 2021-12-09 RX ADMIN — Medication SCH MMU: at 08:28

## 2021-12-09 RX ADMIN — HEPARIN SODIUM SCH UNIT: 5000 INJECTION, SOLUTION INTRAVENOUS; SUBCUTANEOUS at 00:23

## 2021-12-09 RX ADMIN — TAZOBACTAM SODIUM AND PIPERACILLIN SODIUM SCH MLS/HR: 375; 3 INJECTION, SOLUTION INTRAVENOUS at 00:23

## 2021-12-09 RX ADMIN — HEPARIN SODIUM SCH UNIT: 5000 INJECTION, SOLUTION INTRAVENOUS; SUBCUTANEOUS at 08:28

## 2021-12-09 RX ADMIN — TAZOBACTAM SODIUM AND PIPERACILLIN SODIUM SCH MLS/HR: 375; 3 INJECTION, SOLUTION INTRAVENOUS at 08:27

## 2021-12-09 RX ADMIN — PYRIDOSTIGMINE BROMIDE SCH MG: 60 TABLET ORAL at 08:28

## 2021-12-09 NOTE — NUR
PAGER ID:  [phone] 

MESSAGE:  [id number] [employer] THE [facility] DOC HAS FAXED AN ORDER TO [facility] FOR 
IGG. STEVE JAMIL

## 2021-12-09 NOTE — NUR
Patient in room PCU 3023. I have received report from PATRICK Luo    and had the opportunity to 
ask questions and assume patient care.